# Patient Record
(demographics unavailable — no encounter records)

---

## 2024-10-10 NOTE — PHYSICAL EXAM
[de-identified] : The patient is a well appearing 55 year old male of their stated age. Negative spurling bilateral    Surgical site: Right shoulder    Incision sites: Well healed   Range of motion: 130/130/70/0/10   Motor Testing: Rotator cuff muscles firing in all planes    Stability Testing: Limited by pain   Swelling/Effusion/Ecchymosis: None   Tenderness to palpation: None   Provocative testing: Limited by pain   Right Calf: soft and nontender Left Calf: soft and nontender   Neurovascular Examination: Grossly intact, 2+ distal pulses Contralateral Extremity: Examination grossly benign   Assessment & Plan: The patient is approximately 6 weeks s/p right shoulder EUA arthroscopic rotator cuff repair, biceps tenodesis, labral debridement with rotator interval release, subacromial decompression, bursectomy, CC ligament release and acromioplasty (DOS: 8/28/24).  The patient's post-op plan, protocol and activity modifications have been thoroughly discussed and the patient expressed understanding. Continue physical therapy, advancing as per protocol. No lifting. The patient will control pain as discussed & continue ice and elevation as needed. The patient otherwise may advance activity as discussed. Follow up 6 weeks.  The patient's current medication management of their orthopedic diagnosis was reviewed today: (1) We discussed a comprehensive treatment plan that included possible pharmaceutical management involving the use of prescription strength medications including but not limited to options such as oral Naprosyn 500mg BID, once daily Meloxicam 15 mg, or 500-650 mg Tylenol versus over the counter oral medications and topical prescription NSAID Pennsaid vs over the counter Voltaren gel.  Based on our extensive discussion, the patient declined prescription medication and will use over the counter Advil, Alleve, Voltaren Gel or Tylenol as directed. (2) There is a moderate risk of morbidity with further treatment, especially from use of prescription strength medications and possible side effects of these medications which include upset stomach with oral medications, skin reactions to topical medications and cardiac/renal issues with long term use. (3) I recommended that the patient follow-up with their medical physician to discuss any significant specific potential issues with long term medication use such as interactions with current medications or with exacerbation of underlying medical comorbidities. (4) The benefits and risks associated with use of injectable, oral or topical, prescription and over the counter anti-inflammatory medications were discussed with the patient. The patient voiced understanding of the risks including but not limited to bleeding, stroke, kidney dysfunction, heart disease, and were referred to the black box warning label for further information.   IChela attest that this documentation has been prepared under the direction and in the presence of Provider Dr. Jhonathan Bermudez.   The documentation recorded by the scribe accurately reflects the services IDr. Jhonathan, personally performed and the decisions made by me.

## 2024-10-10 NOTE — HISTORY OF PRESENT ILLNESS
[de-identified] : The patient is s/p right shoulder EUA arthroscopic RCR, biceps tenodesis, anterior, posterior, superior and inferior labral debridement c rotator interval release, SAD, bursectomy, coracoacromial ligament release and AP Date of Surgery: 8/28/24 Pain:    At Rest: 0/10              With Activity:  0-1/10   Mechanism of injury: while bench pressing at the gym, changes his  and felt a crunch This is NOT a Work Related Injury being treated under Worker's Compensation.  This is NOT an athletic injury occurring associated with an interscholastic or organized sports team.  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 A little achy in the mornings, otherwise doing very well.  Additional Information: None

## 2024-11-22 NOTE — HISTORY OF PRESENT ILLNESS
[de-identified] : The patient is s/p right shoulder EUA arthroscopic RCR, biceps tenodesis, anterior, posterior, superior and inferior labral debridement c rotator interval release, SAD, bursectomy, coracoacromial ligament release and AP Date of Surgery: 8/28/24 Pain:    At Rest: 0/10              With Activity:  3-4/10   Mechanism of injury: while bench pressing at the gym, changes his  and felt a crunch This is NOT a Work Related Injury being treated under Worker's Compensation.  This is NOT an athletic injury occurring associated with an interscholastic or organized sports team.  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 Was in PT 3x/week then had to taper to 1x/week for 3 week due to insurance denials. Last visit was 11/19/24. Has no more approved visits. Has lateral shoulder pain and limited ROM. States he has not done any strength training yet.  Additional Information: None

## 2024-11-22 NOTE — PHYSICAL EXAM
[de-identified] : The patient is a well appearing 55 year old male of their stated age. Negative spurling bilateral    Surgical site: Right shoulder    Incision sites: Well healed   Range of motion: 145/145/70/0/20   Motor Testin/5 throughout expect IR 5-/5    Stability Testing: Limited by pain   Swelling/Effusion/Ecchymosis: None   Tenderness to palpation: None   Provocative testing: Limited by pain   Right Calf: soft and nontender Left Calf: soft and nontender   Neurovascular Examination: Grossly intact, 2+ distal pulses Contralateral Extremity: Examination grossly benign   Assessment & Plan: The patient is approximately 12 weeks s/p right shoulder EUA arthroscopic rotator cuff repair, biceps tenodesis, labral debridement with rotator interval release, subacromial decompression, bursectomy, CC ligament release and acromioplasty (DOS: 24).  The patient's post-op plan, protocol and activity modifications have been thoroughly discussed and the patient expressed understanding. Continue physical therapy, advancing as per protocol. The patient presents with increasing shoulder discomfort since being cut off from formal physical  therapy. THIS SERVES AS A LETTER OF MEDICALLY NECESSITY THAT THE PATIENT RETURN TO PHYSICAL THERAPY IN ORDER TO ENSURE AN OPTIMAL SURGICAL OUTCOME AND LIMIT THE RISK OF FAILURE OF THE ABOVE MENTIONED SURGERY AND PREVENT OTHER SURGICAL INTERVENTION OR REINJURY. The patient will control pain as discussed & continue ice and elevation as needed. The patient otherwise may advance activity as discussed. Follow up 6 weeks.  The patient's current medication management of their orthopedic diagnosis was reviewed today: (1) We discussed a comprehensive treatment plan that included possible pharmaceutical management involving the use of prescription strength medications including but not limited to options such as oral Naprosyn 500mg BID, once daily Meloxicam 15 mg, or 500-650 mg Tylenol versus over the counter oral medications and topical prescription NSAID Pennsaid vs over the counter Voltaren gel.  Based on our extensive discussion, the patient declined prescription medication and will use over the counter Advil, Alleve, Voltaren Gel or Tylenol as directed. (2) There is a moderate risk of morbidity with further treatment, especially from use of prescription strength medications and possible side effects of these medications which include upset stomach with oral medications, skin reactions to topical medications and cardiac/renal issues with long term use. (3) I recommended that the patient follow-up with their medical physician to discuss any significant specific potential issues with long term medication use such as interactions with current medications or with exacerbation of underlying medical comorbidities. (4) The benefits and risks associated with use of injectable, oral or topical, prescription and over the counter anti-inflammatory medications were discussed with the patient. The patient voiced understanding of the risks including but not limited to bleeding, stroke, kidney dysfunction, heart disease, and were referred to the black box warning label for further information.   IChela attest that this documentation has been prepared under the direction and in the presence of Provider Dr. Jhonathan Bermudez.   The documentation recorded by the scribe accurately reflects the service Dalila OLIVA PA-C, personally performed and the decisions made by me. The patient was seen by me under the direct supervision of Dr. Jhonathan Bermudez.

## 2024-11-22 NOTE — PHYSICAL EXAM
[de-identified] : The patient is a well appearing 55 year old male of their stated age. Negative spurling bilateral    Surgical site: Right shoulder    Incision sites: Well healed   Range of motion: 145/145/70/0/20   Motor Testin/5 throughout expect IR 5-/5    Stability Testing: Limited by pain   Swelling/Effusion/Ecchymosis: None   Tenderness to palpation: None   Provocative testing: Limited by pain   Right Calf: soft and nontender Left Calf: soft and nontender   Neurovascular Examination: Grossly intact, 2+ distal pulses Contralateral Extremity: Examination grossly benign   Assessment & Plan: The patient is approximately 12 weeks s/p right shoulder EUA arthroscopic rotator cuff repair, biceps tenodesis, labral debridement with rotator interval release, subacromial decompression, bursectomy, CC ligament release and acromioplasty (DOS: 24).  The patient's post-op plan, protocol and activity modifications have been thoroughly discussed and the patient expressed understanding. Continue physical therapy, advancing as per protocol. The patient presents with increasing shoulder discomfort since being cut off from formal physical  therapy. THIS SERVES AS A LETTER OF MEDICALLY NECESSITY THAT THE PATIENT RETURN TO PHYSICAL THERAPY IN ORDER TO ENSURE AN OPTIMAL SURGICAL OUTCOME AND LIMIT THE RISK OF FAILURE OF THE ABOVE MENTIONED SURGERY AND PREVENT OTHER SURGICAL INTERVENTION OR REINJURY. The patient will control pain as discussed & continue ice and elevation as needed. The patient otherwise may advance activity as discussed. Follow up 6 weeks.  The patient's current medication management of their orthopedic diagnosis was reviewed today: (1) We discussed a comprehensive treatment plan that included possible pharmaceutical management involving the use of prescription strength medications including but not limited to options such as oral Naprosyn 500mg BID, once daily Meloxicam 15 mg, or 500-650 mg Tylenol versus over the counter oral medications and topical prescription NSAID Pennsaid vs over the counter Voltaren gel.  Based on our extensive discussion, the patient declined prescription medication and will use over the counter Advil, Alleve, Voltaren Gel or Tylenol as directed. (2) There is a moderate risk of morbidity with further treatment, especially from use of prescription strength medications and possible side effects of these medications which include upset stomach with oral medications, skin reactions to topical medications and cardiac/renal issues with long term use. (3) I recommended that the patient follow-up with their medical physician to discuss any significant specific potential issues with long term medication use such as interactions with current medications or with exacerbation of underlying medical comorbidities. (4) The benefits and risks associated with use of injectable, oral or topical, prescription and over the counter anti-inflammatory medications were discussed with the patient. The patient voiced understanding of the risks including but not limited to bleeding, stroke, kidney dysfunction, heart disease, and were referred to the black box warning label for further information.   IChela attest that this documentation has been prepared under the direction and in the presence of Provider Dr. Jhonathan Bermudez.   The documentation recorded by the scribe accurately reflects the service Dalila OLIVA PA-C, personally performed and the decisions made by me. The patient was seen by me under the direct supervision of Dr. Jhonathan Bermudez.

## 2024-11-22 NOTE — HISTORY OF PRESENT ILLNESS
[de-identified] : The patient is s/p right shoulder EUA arthroscopic RCR, biceps tenodesis, anterior, posterior, superior and inferior labral debridement c rotator interval release, SAD, bursectomy, coracoacromial ligament release and AP Date of Surgery: 8/28/24 Pain:    At Rest: 0/10              With Activity:  3-4/10   Mechanism of injury: while bench pressing at the gym, changes his  and felt a crunch This is NOT a Work Related Injury being treated under Worker's Compensation.  This is NOT an athletic injury occurring associated with an interscholastic or organized sports team.  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 Was in PT 3x/week then had to taper to 1x/week for 3 week due to insurance denials. Last visit was 11/19/24. Has no more approved visits. Has lateral shoulder pain and limited ROM. States he has not done any strength training yet.  Additional Information: None

## 2024-12-10 NOTE — PLAN
[FreeTextEntry1] : Labs Drawn by Dr. Allen Zamudio due to poor venous access.  Patient required lab testing due to conditions in their past medical history requiring periodic monitoring.  Labs were sent to [facility].   Discussed and reviewed current medications as follows;  Testosterone Cypionate- low testosterone Patient to continue with present medications - all medications reconciled/reviewed during this visit and listed above.   Increase fluid intake. RTO in 7-10 days for re-evaluation. At least 20 minutes was spent with patient face to face examining and reviewing findings/results during this visit. Ample time was provided to answer any questions or address concerns to the patients satisfaction.   I, Mauro Kenny, attest that this documentation has been prepared under the direction and in the presence of Provider Allen Zamudio DNP  The documentation recorded by the scribe, in my presence, accurately reflects the service I personally performed, and the decisions made by me with my edits as appropriate. Allen Zamudio DNP

## 2024-12-10 NOTE — PLAN
[FreeTextEntry1] : Labs Drawn by Dr. Allen Zamudio due to poor venous access.  Patient required lab testing due to conditions in their past medical history requiring periodic monitoring.  Labs were sent to [facility].   Discussed and reviewed current medications as follows;  Testosterone Cypionate- low testosterone Patient to continue with present medications - all medications reconciled/reviewed during this visit and listed above.   Increase fluid intake. RTO in 7-10 days for re-evaluation. At least 20 minutes was spent with patient face to face examining and reviewing findings/results during this visit. Ample time was provided to answer any questions or address concerns to the patients satisfaction.   I, Mauro Kenny, attest that this documentation has been prepared under the direction and in the presence of Provider Allen Zamudio DNP  The documentation recorded by the scribe, in my presence, accurately reflects the service I personally performed, and the decisions made by me with my edits as appropriate. Allen Zamudio DNP

## 2024-12-10 NOTE — HISTORY OF PRESENT ILLNESS
[FreeTextEntry1] : Cholesterol and testosterone [de-identified] : Patient encounter today for re-evaluation of cholesterol and testosterone.  States he has been doing well.  Denies any CP, SOB or diff breathing.  No recent fever, chills, cough or cold type symptoms.  Has no other complaints at this time.

## 2024-12-10 NOTE — HISTORY OF PRESENT ILLNESS
[FreeTextEntry1] : Cholesterol and testosterone [de-identified] : Patient encounter today for re-evaluation of cholesterol and testosterone.  States he has been doing well.  Denies any CP, SOB or diff breathing.  No recent fever, chills, cough or cold type symptoms.  Has no other complaints at this time.

## 2024-12-23 NOTE — PLAN
[FreeTextEntry1] : Labs reviewed with patient during this encounter.  Chol = 218 from 180  Patient to continue with present medications - all medications reconciled/reviewed during this visit and listed above Patient to start Vitamin therapy as discussed during visit Increase fluid intake..  RTO for repeat labs in 3 months.  RTO in 3 months for re-evaluation.  Diet teaching for at least 8 minutes.performed in depth during this visit related to cholesterol and cardiovascular risks. At least 25 minutes was spent with patient via video conference reviewing findings/results during this visit. Ample time was provided to answer any questions or address concerns to the patients satisfaction..  Patient was advised that this audiovisual encounter constitutes a billable visit, and that the visit will be billed on the same terms and conditions as an in-office, face-to-face visit. Patient was further advised they may be responsible for any co-payment, co-insurance, or other self-payment they would normally pay for an office visit, unless such self-payment was waived by their insurance carrier. 		 At this time the patient is not willing to begin medication therapy for Cholesterol and wishes to modify diet in attempt to correct levels. The risk of elevated lipids were discussed at length during this visit and patient states understanding..

## 2024-12-23 NOTE — HISTORY OF PRESENT ILLNESS
[Home] : at home, [unfilled] , at the time of the visit. [Medical Office: ([facility] [place])___] : at the medical office located in  [Verbal consent obtained from patient] : the patient, [unfilled] [FreeTextEntry1] : Cholesterol [de-identified] : Patient encounter today for re-evaluation of cholesterol.  States he has been doing well.  Denies any CP, SOB or diff breathing.  No recent fever, chills, cough or cold type symptoms.  Has no other complaints at this time.

## 2025-01-03 NOTE — HISTORY OF PRESENT ILLNESS
[de-identified] : The patient is a 55 year old ambidextrous male following up for his right shoulder.  Date of Surgery: 8/28/24 Pain:    At Rest: 0/10              With Activity:  0-6/10   Mechanism of injury: while bench pressing at the gym, changes his  and felt a crunch This is NOT a Work Related Injury being treated under Worker's Compensation.  This is NOT an athletic injury occurring associated with an interscholastic or organized sports team.  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  Additional Information: s/p right shoulder EUA arthroscopic RCR, biceps tenodesis, anterior, posterior, superior and inferior labral debridement c rotator interval release, SAD, bursectomy, coracoacromial ligament release and AP

## 2025-01-03 NOTE — IMAGING
[de-identified] : The patient is a well appearing 55 year old male of their stated age. Negative spurling bilateral    Surgical site: Right shoulder    Incision sites: Well healed   Range of motion: 150/150/75/5/20  Motor Testin-/5 ABD, FF, and FSP, IR & ER 5/5    Stability Testing: Stable ligamentous exam    Swelling/Effusion/Ecchymosis: None   Tenderness to palpation: None   Provocative testing: -impingement, -Cass's    Right Calf: soft and nontender Left Calf: soft and nontender   Neurovascular Examination: Grossly intact, 2+ distal pulses Contralateral Extremity: Examination grossly benign   Assessment & Plan: The patient is approximately 4 months s/p right shoulder EUA arthroscopic rotator cuff repair, biceps tenodesis, labral debridement with rotator interval release, subacromial decompression, bursectomy, CC ligament release and acromioplasty (DOS: 24).  The patient's post-op plan, protocol and activity modifications have been thoroughly discussed and the patient expressed understanding. Continue physical therapy, advancing as per protocol. He requires manual formal physical therapy in order to restore the last degrees of range of motion. THIS SERVES AS A LETTER OF MEDICALLY NECESSITY THAT THE PATIENT RETURN TO PHYSICAL THERAPY IN ORDER TO ENSURE AN OPTIMAL SURGICAL OUTCOME AND LIMIT THE RISK OF FAILURE OF THE ABOVE MENTIONED SURGERY AND PREVENT OTHER SURGICAL INTERVENTION OR REINJURY. Emphasized importance of home exercise programs. The patient will control pain as discussed & continue ice and elevation as needed. The patient otherwise may advance activity as discussed. Follow up 6 weeks.  The patient's current medication management of their orthopedic diagnosis was reviewed today: The patient declined and/or was contraindicated for the recommended prescription medication Naprosyn and will use over the counter Advil, Aleve, Voltaren Gel or Tylenol as directed. (1) We discussed a comprehensive treatment plan that included possible pharmaceutical management involving the use of prescription strength medications including but not limited to options such as oral Naprosyn 500mg BID, once daily Meloxicam 15 mg, or 500-650 mg Tylenol versus over the counter oral medications and topical prescription NSAID Pennsaid vs over the counter Voltaren gel.  Based on our extensive discussion, the patient declined prescription medication and will use over the counter Advil, Alleve, Voltaren Gel or Tylenol as directed. (2) There is a moderate risk of morbidity with further treatment, especially from use of prescription strength medications and possible side effects of these medications which include upset stomach with oral medications, skin reactions to topical medications and cardiac/renal issues with long term use. (3) I recommended that the patient follow-up with their medical physician to discuss any significant specific potential issues with long term medication use such as interactions with current medications or with exacerbation of underlying medical comorbidities. (4) The benefits and risks associated with use of injectable, oral or topical, prescription and over the counter anti-inflammatory medications were discussed with the patient. The patient voiced understanding of the risks including but not limited to bleeding, stroke, kidney dysfunction, heart disease, and were referred to the black box warning label for further information.  I, Chela Govea attest that this documentation has been prepared under the direction and in the presence of Provider Dr. Jhonathan Bermudez.   The documentation recorded by the scribe accurately reflects the service PJ Borjas PA-C personally performed and the decisions made by me. The patient was seen by me under the direct supervision of Dr. Jhonathan Bermudez

## 2025-01-03 NOTE — HISTORY OF PRESENT ILLNESS
[de-identified] : The patient is a 55 year old ambidextrous male following up for his right shoulder.  Date of Surgery: 8/28/24 Pain:    At Rest: 0/10              With Activity:  0-6/10   Mechanism of injury: while bench pressing at the gym, changes his  and felt a crunch This is NOT a Work Related Injury being treated under Worker's Compensation.  This is NOT an athletic injury occurring associated with an interscholastic or organized sports team.  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  Additional Information: s/p right shoulder EUA arthroscopic RCR, biceps tenodesis, anterior, posterior, superior and inferior labral debridement c rotator interval release, SAD, bursectomy, coracoacromial ligament release and AP

## 2025-01-03 NOTE — IMAGING
[de-identified] : The patient is a well appearing 55 year old male of their stated age. Negative spurling bilateral    Surgical site: Right shoulder    Incision sites: Well healed   Range of motion: 150/150/75/5/20  Motor Testin-/5 ABD, FF, and FSP, IR & ER 5/5    Stability Testing: Stable ligamentous exam    Swelling/Effusion/Ecchymosis: None   Tenderness to palpation: None   Provocative testing: -impingement, -[place]'s    Right Calf: soft and nontender Left Calf: soft and nontender   Neurovascular Examination: Grossly intact, 2+ distal pulses Contralateral Extremity: Examination grossly benign   Assessment & Plan: The patient is approximately 4 months s/p right shoulder EUA arthroscopic rotator cuff repair, biceps tenodesis, labral debridement with rotator interval release, subacromial decompression, bursectomy, CC ligament release and acromioplasty (DOS: 24).  The patient's post-op plan, protocol and activity modifications have been thoroughly discussed and the patient expressed understanding. Continue physical therapy, advancing as per protocol. He requires manual formal physical therapy in order to restore the last degrees of range of motion. THIS SERVES AS A LETTER OF MEDICALLY NECESSITY THAT THE PATIENT RETURN TO PHYSICAL THERAPY IN ORDER TO ENSURE AN OPTIMAL SURGICAL OUTCOME AND LIMIT THE RISK OF FAILURE OF THE ABOVE MENTIONED SURGERY AND PREVENT OTHER SURGICAL INTERVENTION OR REINJURY. Emphasized importance of home exercise programs. The patient will control pain as discussed & continue ice and elevation as needed. The patient otherwise may advance activity as discussed. Follow up 6 weeks.  The patient's current medication management of their orthopedic diagnosis was reviewed today: The patient declined and/or was contraindicated for the recommended prescription medication Naprosyn and will use over the counter Advil, Aleve, Voltaren Gel or Tylenol as directed. (1) We discussed a comprehensive treatment plan that included possible pharmaceutical management involving the use of prescription strength medications including but not limited to options such as oral Naprosyn 500mg BID, once daily Meloxicam 15 mg, or 500-650 mg Tylenol versus over the counter oral medications and topical prescription NSAID Pennsaid vs over the counter Voltaren gel.  Based on our extensive discussion, the patient declined prescription medication and will use over the counter Advil, Alleve, Voltaren Gel or Tylenol as directed. (2) There is a moderate risk of morbidity with further treatment, especially from use of prescription strength medications and possible side effects of these medications which include upset stomach with oral medications, skin reactions to topical medications and cardiac/renal issues with long term use. (3) I recommended that the patient follow-up with their medical physician to discuss any significant specific potential issues with long term medication use such as interactions with current medications or with exacerbation of underlying medical comorbidities. (4) The benefits and risks associated with use of injectable, oral or topical, prescription and over the counter anti-inflammatory medications were discussed with the patient. The patient voiced understanding of the risks including but not limited to bleeding, stroke, kidney dysfunction, heart disease, and were referred to the black box warning label for further information.  I, Chela Govea attest that this documentation has been prepared under the direction and in the presence of Provider Dr. Jhonathan Bermudez.   The documentation recorded by the scribe accurately reflects the service PJ Borjas PA-C personally performed and the decisions made by me. The patient was seen by me under the direct supervision of Dr. Jhonathan Bermudez

## 2025-02-14 NOTE — IMAGING
[de-identified] : The patient is a well appearing 55 year old male of their stated age. Negative spurling bilateral    Surgical site: Right shoulder    Incision sites: Well healed   Range of motion: 175/175/75/10/25  Motor Testin-/5 ABD, FF, and FSP, IR & ER 5/5    Stability Testing: Stable ligamentous exam    Swelling/Effusion/Ecchymosis: None   Tenderness to palpation: None   Provocative testing: -impingement, -[place]'s    Right Calf: soft and nontender Left Calf: soft and nontender   Neurovascular Examination: Grossly intact, 2+ distal pulses Contralateral Extremity: Examination grossly benign   Assessment & Plan: The patient is approximately 5.5 months s/p right shoulder EUA arthroscopic rotator cuff repair, biceps tenodesis, labral debridement with rotator interval release, subacromial decompression, bursectomy, CC ligament release and acromioplasty (DOS: 24).  The patient's post-op plan, protocol and activity modifications have been thoroughly discussed and the patient expressed understanding. Despite our previous request for extended formal physical therapy, he has not been approved. He presents with a lack of shoulder range of motion although his strength is improving. Recommended he continue HEP and stretching. Work on strengthening and endurance. The patient will control pain as discussed & continue ice and elevation as needed. The patient otherwise may advance activity as discussed. Follow up 6 months for one year post-op.  The patient's current medication management of their orthopedic diagnosis was reviewed today: The patient declined and/or was contraindicated for the recommended prescription medication Naprosyn and will use over the counter Advil, Aleve, Voltaren Gel or Tylenol as directed. (1) We discussed a comprehensive treatment plan that included possible pharmaceutical management involving the use of prescription strength medications including but not limited to options such as oral Naprosyn 500mg BID, once daily Meloxicam 15 mg, or 500-650 mg Tylenol versus over the counter oral medications and topical prescription NSAID Pennsaid vs over the counter Voltaren gel.  Based on our extensive discussion, the patient declined prescription medication and will use over the counter Advil, Alleve, Voltaren Gel or Tylenol as directed. (2) There is a moderate risk of morbidity with further treatment, especially from use of prescription strength medications and possible side effects of these medications which include upset stomach with oral medications, skin reactions to topical medications and cardiac/renal issues with long term use. (3) I recommended that the patient follow-up with their medical physician to discuss any significant specific potential issues with long term medication use such as interactions with current medications or with exacerbation of underlying medical comorbidities. (4) The benefits and risks associated with use of injectable, oral or topical, prescription and over the counter anti-inflammatory medications were discussed with the patient. The patient voiced understanding of the risks including but not limited to bleeding, stroke, kidney dysfunction, heart disease, and were referred to the black box warning label for further information.  IChela attest that this documentation has been prepared under the direction and in the presence of Provider Dr. Jhonathan Bermudez.   The documentation recorded by the scribe accurately reflects the service PJ Borjas PA-C personally performed and the decisions made by me. The patient was seen by me under the direct supervision of Dr. Jhonathan Bermudez

## 2025-02-14 NOTE — IMAGING
[de-identified] : The patient is a well appearing 55 year old male of their stated age. Negative spurling bilateral    Surgical site: Right shoulder    Incision sites: Well healed   Range of motion: 175/175/75/10/25  Motor Testin-/5 ABD, FF, and FSP, IR & ER 5/5    Stability Testing: Stable ligamentous exam    Swelling/Effusion/Ecchymosis: None   Tenderness to palpation: None   Provocative testing: -impingement, -[place]'s    Right Calf: soft and nontender Left Calf: soft and nontender   Neurovascular Examination: Grossly intact, 2+ distal pulses Contralateral Extremity: Examination grossly benign   Assessment & Plan: The patient is approximately 5.5 months s/p right shoulder EUA arthroscopic rotator cuff repair, biceps tenodesis, labral debridement with rotator interval release, subacromial decompression, bursectomy, CC ligament release and acromioplasty (DOS: 24).  The patient's post-op plan, protocol and activity modifications have been thoroughly discussed and the patient expressed understanding. Despite our previous request for extended formal physical therapy, he has not been approved. He presents with a lack of shoulder range of motion although his strength is improving. Recommended he continue HEP and stretching. Work on strengthening and endurance. The patient will control pain as discussed & continue ice and elevation as needed. The patient otherwise may advance activity as discussed. Follow up 6 months for one year post-op.  The patient's current medication management of their orthopedic diagnosis was reviewed today: The patient declined and/or was contraindicated for the recommended prescription medication Naprosyn and will use over the counter Advil, Aleve, Voltaren Gel or Tylenol as directed. (1) We discussed a comprehensive treatment plan that included possible pharmaceutical management involving the use of prescription strength medications including but not limited to options such as oral Naprosyn 500mg BID, once daily Meloxicam 15 mg, or 500-650 mg Tylenol versus over the counter oral medications and topical prescription NSAID Pennsaid vs over the counter Voltaren gel.  Based on our extensive discussion, the patient declined prescription medication and will use over the counter Advil, Alleve, Voltaren Gel or Tylenol as directed. (2) There is a moderate risk of morbidity with further treatment, especially from use of prescription strength medications and possible side effects of these medications which include upset stomach with oral medications, skin reactions to topical medications and cardiac/renal issues with long term use. (3) I recommended that the patient follow-up with their medical physician to discuss any significant specific potential issues with long term medication use such as interactions with current medications or with exacerbation of underlying medical comorbidities. (4) The benefits and risks associated with use of injectable, oral or topical, prescription and over the counter anti-inflammatory medications were discussed with the patient. The patient voiced understanding of the risks including but not limited to bleeding, stroke, kidney dysfunction, heart disease, and were referred to the black box warning label for further information.  IChela attest that this documentation has been prepared under the direction and in the presence of Provider Dr. Jhonatahn Bermudez.   The documentation recorded by the scribe accurately reflects the service PJ Borjas PA-C personally performed and the decisions made by me. The patient was seen by me under the direct supervision of Dr. Jhonathan Bermudez

## 2025-02-14 NOTE — HISTORY OF PRESENT ILLNESS
[de-identified] : The patient is a 55 year old ambidextrous male following up for his right shoulder.  Date of Surgery: 8/28/24 Pain:    At Rest: 0/10              With Activity:  0/10   Mechanism of injury: while bench pressing at the gym, changes his  and felt a crunch This is NOT a Work Related Injury being treated under Worker's Compensation.  This is NOT an athletic injury occurring associated with an interscholastic or organized sports team.  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 Working on stretching as well as strengthening.  Additional Information: s/p right shoulder EUA arthroscopic RCR, biceps tenodesis, anterior, posterior, superior and inferior labral debridement c rotator interval release, SAD, bursectomy, coracoacromial ligament release and AP

## 2025-02-14 NOTE — HISTORY OF PRESENT ILLNESS
[de-identified] : The patient is a 55 year old ambidextrous male following up for his right shoulder.  Date of Surgery: 8/28/24 Pain:    At Rest: 0/10              With Activity:  0/10   Mechanism of injury: while bench pressing at the gym, changes his  and felt a crunch This is NOT a Work Related Injury being treated under Worker's Compensation.  This is NOT an athletic injury occurring associated with an interscholastic or organized sports team.  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 Working on stretching as well as strengthening.  Additional Information: s/p right shoulder EUA arthroscopic RCR, biceps tenodesis, anterior, posterior, superior and inferior labral debridement c rotator interval release, SAD, bursectomy, coracoacromial ligament release and AP

## 2025-03-17 NOTE — HISTORY OF PRESENT ILLNESS
[FreeTextEntry1] : Cholesterol and testosterone [de-identified] : Patient encounter today for re-evaluation of cholesterol and testosterone.  States he has been doing well.  Denies any CP, SOB or diff breathing.  No recent fever, chills, cough or cold type symptoms.  Has no other complaints at this time.

## 2025-03-17 NOTE — PLAN
23-Sep-2019 13:03 [FreeTextEntry1] : Labs Drawn by Dr. Allen Zamudio due to poor venous access.  Patient required lab testing due to conditions in their past medical history requiring periodic monitoring.  Labs were sent to [facility].  Discussed and reviewed current medications as follows;  Testosterone Cypionate- low testosterone Patient to continue with present medications - all medications reconciled/reviewed during this visit and listed above.   Increase fluid intake. RTO in 7-10 days for re-evaluation. At least 30 minutes was spent with patient face to face examining and reviewing findings/results during this visit. Ample time was provided to answer any questions or address concerns to the patients satisfaction..Plan   I, Mauro Kenny, attest that this documentation has been prepared under the direction and in the presence of Provider Allen Zamudio DNP  The documentation recorded by the scribe, in my presence, accurately reflects the service I personally performed, and the decisions made by me with my edits as appropriate. Allen Zamudio DNP

## 2025-03-24 NOTE — PLAN
[FreeTextEntry1] : Labs reviewed with patient during this encounter.  Trig = 419 from 280 Chol = 190 Test = 588  Patient to continue with present medications - all medications reconciled/reviewed during this visit and listed above Patient to start Vitamin therapy as discussed during visit Increase fluid intake..  RTO for repeat labs in 3 months.  RTO in 3 months for re-evaluation.  Diet teaching for at least 8 minutes.performed in depth during this visit related to cholesterol and cardiovascular risks. At least 25 minutes was spent with patient via video conference reviewing findings/results during this visit. Ample time was provided to answer any questions or address concerns to the patients satisfaction..  Patient was advised that this audiovisual encounter constitutes a billable visit, and that the visit will be billed on the same terms and conditions as an in-office, face-to-face visit. Patient was further advised they may be responsible for any co-payment, co-insurance, or other self-payment they would normally pay for an office visit, unless such self-payment was waived by their insurance carrier. 		 At this time the patient is not willing to begin medication therapy for Cholesterol and wishes to modify diet in attempt to correct levels. The risk of elevated lipids were discussed at length during this visit and patient states understanding..

## 2025-03-24 NOTE — HISTORY OF PRESENT ILLNESS
[Home] : at home, [unfilled] , at the time of the visit. [Medical Office: ([facility])___] : at the medical office located in  [Telehealth (audio & video)] : This visit was provided via telehealth using real-time 2-way audio visual technology. [Verbal consent obtained from patient] : the patient, [unfilled] [FreeTextEntry1] : Cholesterol [de-identified] : Patient encounter today for re-evaluation of cholesterol.  States he has been doing well.  Denies any CP, SOB or diff breathing.  No recent fever, chills, cough or cold type symptoms.  Has no other complaints at this time.

## 2025-07-01 NOTE — PLAN
[FreeTextEntry1] : Labs reviewed with patient during this encounter.  Patient to continue with present medications - all medications reconciled/reviewed during this visit and listed above Patient to start Vitamin therapy as discussed during visit Increase fluid intake..  RTO for repeat labs in 6 months.  RTO in 6 months for re-evaluation.  Diet teaching for at least 8 minutes.performed in depth during this visit related to cholesterol and cardiovascular risks. At least 25 minutes was spent with patient via video conference reviewing findings/results during this visit. Ample time was provided to answer any questions or address concerns to the patients satisfaction..  Patient was advised that this audiovisual encounter constitutes a billable visit, and that the visit will be billed on the same terms and conditions as an in-office, face-to-face visit. Patient was further advised they may be responsible for any co-payment, co-insurance, or other self-payment they would normally pay for an office visit, unless such self-payment was waived by their insurance carrier.

## 2025-07-01 NOTE — HISTORY OF PRESENT ILLNESS
[Home] : at home, [unfilled] , at the time of the visit. [Medical Office: ([facility])___] : at the medical office located in  [Telehealth (audio & video)] : This visit was provided via telehealth using real-time 2-way audio visual technology. [Verbal consent obtained from patient] : the patient, [unfilled] [FreeTextEntry1] : CHolesterol [de-identified] : Patient encounter today for re-evaluation of cholesterol.  States he has been doing well.  Denies any CP, SOB or diff breathing.  No recent fever, chills, cough or cold type symptoms.  Has no other complaints at this time.